# Patient Record
Sex: FEMALE | Race: WHITE
[De-identification: names, ages, dates, MRNs, and addresses within clinical notes are randomized per-mention and may not be internally consistent; named-entity substitution may affect disease eponyms.]

---

## 2018-11-19 ENCOUNTER — HOSPITAL ENCOUNTER (EMERGENCY)
Dept: HOSPITAL 88 - ER | Age: 23
Discharge: HOME | End: 2018-11-19
Payer: COMMERCIAL

## 2018-11-19 VITALS — BODY MASS INDEX: 35.44 KG/M2 | HEIGHT: 63 IN | WEIGHT: 200 LBS

## 2018-11-19 DIAGNOSIS — S82.65XA: Primary | ICD-10-CM
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
## 2018-11-19 PROCEDURE — 99284 EMERGENCY DEPT VISIT MOD MDM: CPT

## 2018-11-19 NOTE — DIAGNOSTIC IMAGING REPORT
EXAM: ANKLE 3+ VIEWS LEFT



DATE: 11/19/2018 5:29 PM

 

INDICATION:   Fall/swelling    



COMPARISON: None



FINDINGS:

There is a spiral fracture of the distal fibula with moderate overlying soft

tissue swelling. The mortise is grossly symmetric with no abnormality of the

talar dome. On the lateral view there is a nondisplaced fracture of the

posterior malleolus. No definite medial malleolar fracture.



IMPRESSION: 

Spiral fracture of the distal fibula and nondisplaced fracture of the posterior

malleolus with moderate soft tissue swelling. If concern is present for

ligamentous injury, stress views could be obtained.



Signed by: Dr. Azar Mccoy MD on 11/19/2018 7:20 PM

## 2018-11-27 ENCOUNTER — HOSPITAL ENCOUNTER (OUTPATIENT)
Dept: HOSPITAL 88 - OR | Age: 23
Discharge: HOME | End: 2018-11-27
Attending: SPECIALIST
Payer: COMMERCIAL

## 2018-11-27 VITALS — DIASTOLIC BLOOD PRESSURE: 81 MMHG | SYSTOLIC BLOOD PRESSURE: 124 MMHG

## 2018-11-27 DIAGNOSIS — S82.62XA: Primary | ICD-10-CM

## 2018-11-27 DIAGNOSIS — F41.9: ICD-10-CM
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
## 2018-11-27 PROCEDURE — 76000 FLUOROSCOPY <1 HR PHYS/QHP: CPT

## 2018-11-27 PROCEDURE — 81025 URINE PREGNANCY TEST: CPT

## 2018-11-27 NOTE — OPERATIVE REPORT
DATE OF PROCEDURE:  November 27, 2018 



ASSISTANT:   Fitz Clifford PA-C



The patient was brought to the operating room for induction of anesthesia.  

Throughout this case, my PA's assistance was necessary for retraction of 

soft tissue and positioning of the extremity.  This allows for efficient 

and technically successful execution of the operation and is considered 

medically necessary.



PREOPERATIVE DIAGNOSIS:  Left ankle fracture.



POSTOPERATIVE DIAGNOSIS:  Left ankle fracture.



PROCEDURE:  Open reduction and internal fixation left ankle (fibula only).



INDICATIONS:  The patient is a 22-year-old young lady who has a displaced 

distal fibular fracture.  The findings and options have been discussed with 

the patient and her mother.  We plan on open reduction with internal 

fixation.  The risks and benefits were explained.  All of their questions 

were answered.  The recovery was discussed.  They state they understand and 

wish to proceed.



DESCRIPTION OF PROCEDURE:  The patient was brought to the operating room 

and placed under general anesthetic.  She received prophylactic antibiotics 

in the holding area.  Her left lower extremity was prepped and draped in a 

sterile manner.  A preoperative time out was performed.  The extremity was 

exsanguinated, and a proximal tourniquet was inflated to 300 mmHg.  A 

lateral incision was made over the distal fibula.  The fracture site was 

carefully exposed.  Minimal periosteal stripping was performed.  There was 

an oblique fracture of the distal fibula with about 3 to 4 mm of 

displacement.  A lobster claw reduction clamp was used to reduce the 

fracture in an anatomic position.  A 7-hole one-third semi-tubular plate 

was then placed onto the distal fibula.  The lower portion was contoured 

gently.  A combination of locking and compression screws were used to fix 

the distal fibula.  Consideration was given for a lag screw.  The fixation 

was felt to be good, and I felt a lag screw might compromise the distal 

fixation.  We elected to not place a lag screw.  The wound was thoroughly 

irrigated.  The deep soft tissue was reapproximated with 0 Vicryl.  The 

skin was closed with subcuticular Vicryl and nylon stitches.  About 8 mL of 

0.5% Marcaine was injected around the incision.  A sterile bandage and a 

splint were applied.  Estimated blood loss was less than 5 mL.  All needle 

and sponge counts were correct.  











DD:  11/27/2018 11:28

DT:  11/27/2018 12:44

Job#:  [id number]

## 2018-11-27 NOTE — XMS REPORT
Patient Summary Document

                             Created on: 2018



GEOFF MCCLELLAN

External Reference #: [id number]

: 1995

Sex: Female



Demographics







                          Address                   [address] [place] [address]  [id number]

 

                          Home Phone                [phone]

 

                          Preferred Language        Unknown

 

                          Marital Status            Unknown

 

                          Advent Affiliation     Unknown

 

                          Race                      Unknown

 

                                        Additional Race(s)  

 

                          Ethnic Group              Unknown





Author







                          Author                    [facility]

 

                          [facility]

 

                          Address                   Unknown

 

                          Phone                     Unavailable







Care Team Providers







                    Care Team Member Name    Role                Phone

 

                    SWEET, A LAIRD      Unavailable         Unavailable







Problems

This patient has no known problems.



Allergies, Adverse Reactions, Alerts

This patient has no known allergies or adverse reactions.



Medications

This patient has no known medications.



Results







           Test Description    Test Time    Test Comments    Text Results    Atomic Results    Result

 Comments

 

                ANKLE 3+ VIEWS LEFT    2018 19:19:00                                                       

                                                   [facility]                        [address] [address]      Patient Name: GEOFF MCCLELLAN                                   
MR #: [id number]                     : 1995                            
      Age/Sex: 22/F  Acct #: [id number]                              Req #: 
[id number]  [facility] Physician:                                                      
Ordered by: LAURIE FREEDMAN NP                            Report #: [id number]
       Location: ER                                      Room/Bed:              
      
___________________________________________________________________________________________________
   Procedure: [id number] DX/ANKLE 3+ VIEWS LEFT  Exam Date: 18             
              Exam Time:                                               
REPORT STATUS: Signed    EXAM: ANKLE 3+ VIEWS LEFT      DATE: 2018 5:29 PM
      INDICATION:   Fall/swelling          COMPARISON: None      FINDINGS:   
There is a spiral fracture of the distal fibula with moderate overlying soft   
tissue swelling. The mortise is grossly symmetric with no abnormality of the   
talar dome. On the lateral view there is a nondisplaced fracture of the   
posterior malleolus. No definite medial malleolar fracture.      IMPRESSION:    
Spiral fracture of the distal fibula and nondisplaced fracture of the posterior 
 malleolus with moderate soft tissue swelling. If concern is present for   
ligamentous injury, stress views could be obtained.      Signed by: Dr. Azar Guardado MD on 2018 7:20 PM        Dictated By: AZAR GUARDADO MD  
Electronically Signed By: AZAR GUARDADO MD on 18  Transcribed By: 
DANIELE on 18       COPY TO:   LAURIE FREEDMAN NP